# Patient Record
Sex: FEMALE | Race: WHITE | NOT HISPANIC OR LATINO | URBAN - METROPOLITAN AREA
[De-identification: names, ages, dates, MRNs, and addresses within clinical notes are randomized per-mention and may not be internally consistent; named-entity substitution may affect disease eponyms.]

---

## 2024-01-13 ENCOUNTER — APPOINTMENT (OUTPATIENT)
Dept: GENERAL RADIOLOGY | Facility: HOSPITAL | Age: 44
End: 2024-01-13
Payer: MEDICAID

## 2024-01-13 ENCOUNTER — HOSPITAL ENCOUNTER (EMERGENCY)
Facility: HOSPITAL | Age: 44
Discharge: HOME | End: 2024-01-13
Attending: EMERGENCY MEDICINE
Payer: MEDICAID

## 2024-01-13 VITALS
SYSTOLIC BLOOD PRESSURE: 133 MMHG | DIASTOLIC BLOOD PRESSURE: 58 MMHG | WEIGHT: 119.27 LBS | OXYGEN SATURATION: 100 % | HEART RATE: 75 BPM | HEIGHT: 62 IN | RESPIRATION RATE: 16 BRPM | TEMPERATURE: 97.9 F | BODY MASS INDEX: 21.95 KG/M2

## 2024-01-13 DIAGNOSIS — R06.00 DYSPNEA, UNSPECIFIED TYPE: Primary | ICD-10-CM

## 2024-01-13 LAB
ALBUMIN SERPL-MCNC: 4.6 G/DL (ref 3.5–5.2)
ALBUMIN/GLOB SERPL: 1.5 G/DL
ALP SERPL-CCNC: 54 U/L (ref 39–117)
ALT SERPL W P-5'-P-CCNC: 19 U/L (ref 1–33)
ANION GAP SERPL CALCULATED.3IONS-SCNC: 9 MMOL/L (ref 5–15)
AST SERPL-CCNC: 21 U/L (ref 1–32)
BASOPHILS # BLD AUTO: 0.1 10*3/MM3 (ref 0–0.2)
BASOPHILS NFR BLD AUTO: 1 % (ref 0–1.5)
BILIRUB SERPL-MCNC: 0.4 MG/DL (ref 0–1.2)
BUN SERPL-MCNC: 15 MG/DL (ref 6–20)
BUN/CREAT SERPL: 19.2 (ref 7–25)
CALCIUM SPEC-SCNC: 9.8 MG/DL (ref 8.6–10.5)
CHLORIDE SERPL-SCNC: 100 MMOL/L (ref 98–107)
CO2 SERPL-SCNC: 26 MMOL/L (ref 22–29)
CREAT SERPL-MCNC: 0.78 MG/DL (ref 0.57–1)
DEPRECATED RDW RBC AUTO: 48.6 FL (ref 37–54)
EGFRCR SERPLBLD CKD-EPI 2021: 97.4 ML/MIN/1.73
EOSINOPHIL # BLD AUTO: 0.2 10*3/MM3 (ref 0–0.4)
EOSINOPHIL NFR BLD AUTO: 3 % (ref 0.3–6.2)
ERYTHROCYTE [DISTWIDTH] IN BLOOD BY AUTOMATED COUNT: 15.3 % (ref 12.3–15.4)
GLOBULIN UR ELPH-MCNC: 3.1 GM/DL
GLUCOSE SERPL-MCNC: 102 MG/DL (ref 65–99)
HCT VFR BLD AUTO: 39.4 % (ref 34–46.6)
HGB BLD-MCNC: 13 G/DL (ref 12–15.9)
LYMPHOCYTES # BLD AUTO: 1.8 10*3/MM3 (ref 0.7–3.1)
LYMPHOCYTES NFR BLD AUTO: 29.7 % (ref 19.6–45.3)
MCH RBC QN AUTO: 30.4 PG (ref 26.6–33)
MCHC RBC AUTO-ENTMCNC: 33 G/DL (ref 31.5–35.7)
MCV RBC AUTO: 92 FL (ref 79–97)
MONOCYTES # BLD AUTO: 0.6 10*3/MM3 (ref 0.1–0.9)
MONOCYTES NFR BLD AUTO: 9.5 % (ref 5–12)
NEUTROPHILS NFR BLD AUTO: 3.4 10*3/MM3 (ref 1.7–7)
NEUTROPHILS NFR BLD AUTO: 56.8 % (ref 42.7–76)
NRBC BLD AUTO-RTO: 0 /100 WBC (ref 0–0.2)
NT-PROBNP SERPL-MCNC: <36 PG/ML (ref 0–450)
PLATELET # BLD AUTO: 374 10*3/MM3 (ref 140–450)
PMV BLD AUTO: 8.2 FL (ref 6–12)
POTASSIUM SERPL-SCNC: 4.3 MMOL/L (ref 3.5–5.2)
PROT SERPL-MCNC: 7.7 G/DL (ref 6–8.5)
RBC # BLD AUTO: 4.29 10*6/MM3 (ref 3.77–5.28)
SODIUM SERPL-SCNC: 135 MMOL/L (ref 136–145)
TROPONIN T SERPL HS-MCNC: <6 NG/L
WBC NRBC COR # BLD AUTO: 6 10*3/MM3 (ref 3.4–10.8)

## 2024-01-13 PROCEDURE — 71045 X-RAY EXAM CHEST 1 VIEW: CPT

## 2024-01-13 PROCEDURE — 84484 ASSAY OF TROPONIN QUANT: CPT | Performed by: PHYSICIAN ASSISTANT

## 2024-01-13 PROCEDURE — 83880 ASSAY OF NATRIURETIC PEPTIDE: CPT | Performed by: PHYSICIAN ASSISTANT

## 2024-01-13 PROCEDURE — 99284 EMERGENCY DEPT VISIT MOD MDM: CPT

## 2024-01-13 PROCEDURE — 93005 ELECTROCARDIOGRAM TRACING: CPT | Performed by: PHYSICIAN ASSISTANT

## 2024-01-13 PROCEDURE — 80053 COMPREHEN METABOLIC PANEL: CPT | Performed by: PHYSICIAN ASSISTANT

## 2024-01-13 PROCEDURE — 36415 COLL VENOUS BLD VENIPUNCTURE: CPT

## 2024-01-13 PROCEDURE — 85025 COMPLETE CBC W/AUTO DIFF WBC: CPT | Performed by: PHYSICIAN ASSISTANT

## 2024-01-13 RX ORDER — SODIUM CHLORIDE 0.9 % (FLUSH) 0.9 %
10 SYRINGE (ML) INJECTION AS NEEDED
Status: DISCONTINUED | OUTPATIENT
Start: 2024-01-13 | End: 2024-01-13 | Stop reason: HOSPADM

## 2024-01-13 NOTE — ED NOTES
"Patient left restroom and handed tech specimen cup with clear cold fluid in it. When this nurse spoke with patient about the contents of cup, patient states \"My life is reduced to spit and piss, I will not give you a sample.\" Patient refuses to provide authentic urine sample.   "

## 2024-01-13 NOTE — DISCHARGE INSTRUCTIONS
Follow-up with dermatology for further evaluation management of your skin concerns.    Follow-up with your primary care provider in 3-5 days.  If you do not have a primary care provider call [phone] for help in finding one, or you may follow up with [facility] at [phone].    Return to ED for any new or worsening symptoms

## 2024-01-13 NOTE — ED NOTES
Education provided to patient. Patient understood to use [employer] to see results and to call numbers listed on AVS

## 2024-01-13 NOTE — ED PROVIDER NOTES
Subjective   History of Present Illness  Patient is a 44-year-old female [facility] significant for prior drug and alcohol abuse presenting to the ED with reports of surge of parasitic infection.  Patient states since October she has noticed small bumps in her skin that she believes LarvE is coming out of.  She states she initially thought she had fleas as she believes that her animals were infested with them.  She states these areas are not pruritic nor painful.  She states at times she feels short of breath she denies any chest pain, headache, visual changes, abdominal pain, nausea, vomiting, or diarrhea.  She states she feels like they are in her nose as well.  She denies any recent drug abuse states she last used about a month ago.    History provided by:  Patient      Review of Systems   Constitutional: Negative.    Respiratory:  Positive for shortness of breath. Negative for apnea, cough, choking, chest tightness, wheezing and stridor.    Cardiovascular: Negative.    Gastrointestinal:  Negative for abdominal pain, nausea and vomiting.   Genitourinary:  Negative for decreased urine volume, difficulty urinating, dysuria, flank pain, frequency, hematuria and urgency.   Musculoskeletal:  Negative for back pain, neck pain and neck stiffness.   Skin:  Positive for color change.       No past medical history on file.    No Known Allergies    No past surgical history on file.    No family history on file.    Social History     Socioeconomic History    Marital status: Single           Objective   Physical Exam  Vitals and nursing note reviewed.   Constitutional:       General: She is not in acute distress.     Appearance: Normal appearance. She is well-developed. She is not ill-appearing, toxic-appearing or diaphoretic.   HENT:      Head: Normocephalic and atraumatic.      Mouth/Throat:      Mouth: Mucous membranes are moist.      Pharynx: Oropharynx is clear.   Eyes:      General: No scleral icterus.     Extraocular  "Movements: Extraocular movements intact.      Pupils: Pupils are equal, round, and reactive to light.   Cardiovascular:      Rate and Rhythm: Normal rate and regular rhythm.      Pulses: Normal pulses.      Heart sounds: No murmur heard.     No friction rub. No gallop.   Pulmonary:      Effort: Pulmonary effort is normal. No respiratory distress.      Breath sounds: Normal breath sounds. No stridor. No wheezing, rhonchi or rales.   Chest:      Chest wall: No tenderness.   Abdominal:      General: Bowel sounds are normal. There is no distension. There are no signs of injury.      Palpations: Abdomen is soft.      Tenderness: There is no abdominal tenderness. There is no right CVA tenderness, left CVA tenderness, guarding or rebound.   Skin:     General: Skin is warm.      Capillary Refill: Capillary refill takes less than 2 seconds.      Coloration: Skin is not cyanotic, jaundiced or pale.      Findings: No abscess, bruising, ecchymosis, lesion, petechiae, rash or wound.   Neurological:      General: No focal deficit present.      Mental Status: She is alert and oriented to person, place, and time.   Psychiatric:         Mood and Affect: Mood normal.         Behavior: Behavior normal.         Procedures           ED Course  ED Course as of 01/13/24 1213   Sat Jan 13, 2024   1106 Patient initially put tap water in her urine specimen.  Charge nurse did discuss this with the patient and she began to get angry and said that she is not giving us urine [AA]      ED Course User Index  [AA] Acacia Navarro PA    /58 (BP Location: Left arm, Patient Position: Lying)   Pulse 75   Temp 97.9 °F (36.6 °C) (Oral)   Resp 16   Ht 157.5 cm (62\")   Wt 54.1 kg (119 lb 4.3 oz)   LMP 01/12/2024   SpO2 100%   BMI 21.81 kg/m²   Medications   sodium chloride 0.9 % flush 10 mL (has no administration in time range)     Labs Reviewed   COMPREHENSIVE METABOLIC PANEL - Abnormal; Notable for the following components:       " Result Value    Glucose 102 (*)     Sodium 135 (*)     All other components within normal limits    Narrative:     GFR Normal >60  Chronic Kidney Disease <60  Kidney Failure <15     BNP (IN-HOUSE) - Normal    Narrative:     This assay is used as an aid in the diagnosis of individuals suspected of having heart failure. It can be used as an aid in the diagnosis of acute decompensated heart failure (ADHF) in patients presenting with signs and symptoms of ADHF to the emergency department (ED). In addition, NT-proBNP of <300 pg/mL indicates ADHF is not likely.    Age Range Result Interpretation  NT-proBNP Concentration (pg/mL:      <50             Positive            >450                   Gray                 300-450                    Negative             <300    50-75           Positive            >900                  Gray                300-900                  Negative            <300      >75             Positive            >1800                  Gray                300-1800                  Negative            <300   SINGLE HSTROPONIN T - Normal    Narrative:     High Sensitive Troponin T Reference Range:  <14.0 ng/L- Negative Female for AMI  <22.0 ng/L- Negative Male for AMI  >=14 - Abnormal Female indicating possible myocardial injury.  >=22 - Abnormal Male indicating possible myocardial injury.   Clinicians would have to utilize clinical acumen, EKG, Troponin, and serial changes to determine if it is an Acute Myocardial Infarction or myocardial injury due to an underlying chronic condition.        CBC WITH AUTO DIFFERENTIAL - Normal   CBC AND DIFFERENTIAL    Narrative:     The following orders were created for panel order CBC & Differential.  Procedure                               Abnormality         Status                     ---------                               -----------         ------                     CBC Auto Differential[[id number]]        Normal              Final result                 Please  view results for these tests on the individual orders.     XR Chest 1 View   Final Result   Impression:   No acute process identified.         Electronically Signed: Angelito Faulkner MD     1/13/2024 10:27 AM [employer]     Workstation ID: [id number]                                                 Medical Decision Making  Differentials: Pleural effusion, pneumonia, CHF, cellulitis, scabies, drug abuse    ;this list is not all inclusive and does not constitute the entirety of considered causes  EKG: Interpreted by myself and Dr. Avery shows sinus rhythm rate 74 probable with atrial enlargement no previous to review  Labs: as above   Radiology: My interpretation chest x-ray shows no acute infiltrate pneumothorax correlated with radiologist interpretation as below.  XR Chest 1 View   Final Result    Impression:    No acute process identified.            Electronically Signed: Angelito Faulkner MD      1/13/2024 10:27 AM [employer]      Workstation ID: [id number]     Disposition/Treatment:  Appropriate PPE was worn during exam and throughout all encounters with the patient.  When the ED patient was afebrile and appeared nontoxic she is not tachycardic or hypoxic.  She presents to the ED with concerns of parasitic infection.  She states she feels like there is bumps on her skin.  On exam I do not appreciate any sort of abscesses or lesions otherwise.  There is no signs of secondary infection like cellulitis or necrotizing rash.  Does have a history of drug abuse in the past however refused to give urine while in the ED today.  Patient will be referred to dermatology for further workup of her skin concerns.  She also states she has had some shortness of breath since October.  No reports of chest pain or unilateral leg swelling.  No concerns for PE as PERC score is low along with normal vitals and no history.  EKG normal.  Labs unremarkable including no leukocytosis, anemia, electrolyte abnormality, normal troponin and BNP and chest x-ray was  unremarkableOn reassessment Patient is resting quietly findings were discussed with the patient at bedside voiced understanding and discharge along with signs symptoms to return.  As stated before advised to follow-up with dermatology for her skin concerns and follow-up with PCP for further evaluation management.  She was in agreement plan all questions were answered.    PERC SCORE:  No need for further workup, as <2% chance of PE.  If no criteria are positive and clinician's pre-test probability is <15%, PERC Rule criteria are satisfied.    This document is intended for medical expert use only. Reading of this document by patients and/or patient's family without participating medical staff guidance may result in misinterpretation and unintended morbidity.  Any interpretation of such data is the responsibility of the patient and/or family member responsible for the patient in concert with their primary or specialist providers, not to be left for sources of online searches such as [employer], [employer] or similar queries. Relying on these approaches to knowledge may result in misinterpretation, misguided goals of care and even death should patients or family members try recommendations outside of the realm of professional medical care in a supervised inpatient environment.       Problems Addressed:  Dyspnea, unspecified type: acute illness or injury    Amount and/or Complexity of Data Reviewed  Labs: ordered. Decision-making details documented in ED Course.  Radiology: ordered. Decision-making details documented in ED Course.  ECG/medicine tests: ordered.    Risk  Prescription drug management.        Final diagnoses:   Dyspnea, unspecified type       ED Disposition  ED Disposition       ED Disposition   Discharge    Condition   Stable    Comment   --               Sydney Montano MD  [facility] [address] [address] [phone]    Schedule an appointment as soon as possible for a visit in 3  days      [facility] EMERGENCY DEPARTMENT  [address] [facility] [address] [phone]  Go to   If symptoms worsen    [facility]  [address] [place] [id number] [phone]  Schedule an appointment as soon as possible for a visit   in regards to your skin concerns         Medication List      No changes were made to your prescriptions during this visit.            Acacia Navarro PA  01/13/24 1215

## 2024-01-13 NOTE — ED NOTES
This tech checked on the patient while she was in the bathroom to see if she needed any help. Patient replied that she is OK and does not need any help at this time.

## 2024-01-14 LAB
QT INTERVAL: 415 MS
QTC INTERVAL: 462 MS